# Patient Record
Sex: FEMALE | Race: WHITE | NOT HISPANIC OR LATINO | URBAN - METROPOLITAN AREA
[De-identification: names, ages, dates, MRNs, and addresses within clinical notes are randomized per-mention and may not be internally consistent; named-entity substitution may affect disease eponyms.]

---

## 2024-10-16 ENCOUNTER — TELEPHONE (OUTPATIENT)
Age: 44
End: 2024-10-16

## 2024-10-16 NOTE — TELEPHONE ENCOUNTER
CALLED AND SPOKE WITH PATIENT. SHE DOES NOT HAVE AN APPOINTMENT YET. STATED SHE HAS NOT HEARD ANYTHING.

## 2024-10-16 NOTE — TELEPHONE ENCOUNTER
PATHOLOGY AND CYTOLOGY LABS NOTIFIED OF CLIENT ABNORMAL PAP AND HPV SUMMARY.   COLLECTED: 9/25/24  GRADE ASCUS  HPV 16 AND HPV 18/45 ARE NEGATIVE   HPV POOL IS POSITIVE.

## 2024-10-22 LAB
NCCN CRITERIA FLAG: NORMAL
TYRER CUZICK SCORE: 19.6

## 2024-10-26 ENCOUNTER — PATIENT MESSAGE (OUTPATIENT)
Age: 44
End: 2024-10-26
Payer: COMMERCIAL

## 2024-10-29 ENCOUNTER — HOSPITAL ENCOUNTER (OUTPATIENT)
Facility: HOSPITAL | Age: 44
Discharge: HOME | End: 2024-10-29
Admitting: FAMILY MEDICINE
Payer: COMMERCIAL

## 2024-10-29 DIAGNOSIS — Z12.31 VISIT FOR SCREENING MAMMOGRAM: ICD-10-CM

## 2024-10-29 PROCEDURE — 77063 BREAST TOMOSYNTHESIS BI: CPT

## 2024-10-29 PROCEDURE — 77067 SCR MAMMO BI INCL CAD: CPT

## 2024-11-04 ENCOUNTER — TELEPHONE (OUTPATIENT)
Dept: GASTROENTEROLOGY | Facility: CLINIC | Age: 44
End: 2024-11-04
Payer: COMMERCIAL

## 2024-11-08 ENCOUNTER — OUTSIDE FACILITY SERVICE (OUTPATIENT)
Dept: GASTROENTEROLOGY | Facility: CLINIC | Age: 44
End: 2024-11-08
Payer: COMMERCIAL

## 2024-11-08 PROCEDURE — 45378 DIAGNOSTIC COLONOSCOPY: CPT | Performed by: INTERNAL MEDICINE

## 2024-12-12 ENCOUNTER — OFFICE VISIT (OUTPATIENT)
Age: 44
End: 2024-12-12
Payer: COMMERCIAL

## 2024-12-12 VITALS
BODY MASS INDEX: 25.29 KG/M2 | WEIGHT: 148.1 LBS | HEIGHT: 64 IN | DIASTOLIC BLOOD PRESSURE: 88 MMHG | HEART RATE: 70 BPM | OXYGEN SATURATION: 99 % | SYSTOLIC BLOOD PRESSURE: 132 MMHG

## 2024-12-12 DIAGNOSIS — F90.0 ATTENTION DEFICIT HYPERACTIVITY DISORDER (ADHD), PREDOMINANTLY INATTENTIVE TYPE: Primary | Chronic | ICD-10-CM

## 2024-12-12 DIAGNOSIS — F41.1 GENERALIZED ANXIETY DISORDER: Chronic | ICD-10-CM

## 2024-12-12 DIAGNOSIS — Z51.81 ENCOUNTER FOR THERAPEUTIC DRUG MONITORING: ICD-10-CM

## 2024-12-12 RX ORDER — METHYLPHENIDATE HYDROCHLORIDE 18 MG/1
18 TABLET ORAL DAILY
Qty: 30 TABLET | Refills: 0 | Status: SHIPPED | OUTPATIENT
Start: 2024-12-12 | End: 2025-12-12
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
## 2024-12-17 LAB
1OH-MIDAZOLAM UR QL SCN: NOT DETECTED NG/MG CREAT
6MAM UR QL SCN: NEGATIVE NG/ML
7AMINOCLONAZEPAM/CREAT UR: NOT DETECTED NG/MG CREAT
A-OH ALPRAZ/CREAT UR: NOT DETECTED NG/MG CREAT
A-OH-TRIAZOLAM/CREAT UR CFM: NOT DETECTED NG/MG CREAT
ACP UR QL CFM: NOT DETECTED
ALPRAZ/CREAT UR CFM: NOT DETECTED NG/MG CREAT
AMPHETAMINES UR QL SCN: NEGATIVE NG/ML
APAP UR QL SCN: NEGATIVE UG/ML
BARBITURATES UR QL SCN: NEGATIVE NG/ML
BENZODIAZ SCN METH UR: NEGATIVE
BUPRENORPHINE UR QL SCN: NEGATIVE
BUPRENORPHINE/CREAT UR: NOT DETECTED NG/MG CREAT
CANNABINOIDS UR QL SCN: NEGATIVE NG/ML
CARISOPRODOL UR QL: NEGATIVE NG/ML
CLONAZEPAM/CREAT UR CFM: NOT DETECTED NG/MG CREAT
COCAINE+BZE UR QL SCN: NEGATIVE NG/ML
CREAT UR-MCNC: 245 MG/DL
D-METHORPHAN UR-MCNC: NOT DETECTED NG/ML
D-METHORPHAN+LEVORPHANOL UR QL: NOT DETECTED
DESALKYLFLURAZ/CREAT UR: NOT DETECTED NG/MG CREAT
DIAZEPAM/CREAT UR: NOT DETECTED NG/MG CREAT
ETHANOL UR QL SCN: NEGATIVE G/DL
ETHANOL UR QL SCN: NEGATIVE NG/ML
FENTANYL CTO UR SCN-MCNC: NEGATIVE NG/ML
FENTANYL/CREAT UR: NOT DETECTED NG/MG CREAT
FLUNITRAZEPAM UR QL SCN: NOT DETECTED NG/MG CREAT
GABAPENTIN UR-MCNC: NEGATIVE UG/ML
HALLUCINOGENS UR: NEGATIVE
HYPNOTICS UR QL SCN: NEGATIVE
KETAMINE UR QL: NOT DETECTED
LORAZEPAM/CREAT UR: NOT DETECTED NG/MG CREAT
MEPERIDINE UR QL SCN: NEGATIVE NG/ML
METHADONE UR QL SCN: NEGATIVE NG/ML
METHADONE+METAB UR QL SCN: NEGATIVE NG/ML
MIDAZOLAM/CREAT UR CFM: NOT DETECTED NG/MG CREAT
MISCELLANEOUS, UR: NEGATIVE
NORBUPRENORPHINE/CREAT UR: NOT DETECTED NG/MG CREAT
NORDIAZEPAM/CREAT UR: NOT DETECTED NG/MG CREAT
NORFENTANYL/CREAT UR: NOT DETECTED NG/MG CREAT
NORFLUNITRAZEPAM UR-MCNC: NOT DETECTED NG/MG CREAT
NORKETAMINE UR-MCNC: NOT DETECTED UG/ML
OPIATES UR SCN-MCNC: NEGATIVE NG/ML
OXAZEPAM/CREAT UR: NOT DETECTED NG/MG CREAT
OXYCODONE CTO UR SCN-MCNC: NEGATIVE NG/ML
PCP UR QL SCN: NEGATIVE NG/ML
PRESCRIBED MEDICATIONS: NORMAL
PROPOXYPH UR QL SCN: NEGATIVE NG/ML
TAPENTADOL CTO UR SCN-MCNC: NEGATIVE NG/ML
TEMAZEPAM/CREAT UR: NOT DETECTED NG/MG CREAT
TRAMADOL UR QL SCN: NEGATIVE NG/ML
ZALEPLON UR-MCNC: NOT DETECTED NG/ML
ZOLPIDEM PHENYL-4-CARB UR QL SCN: NOT DETECTED
ZOLPIDEM UR QL SCN: NOT DETECTED
ZOPICLONE-N-OXIDE UR-MCNC: NOT DETECTED NG/ML

## 2025-01-15 ENCOUNTER — OFFICE VISIT (OUTPATIENT)
Age: 45
End: 2025-01-15
Payer: COMMERCIAL

## 2025-01-15 VITALS
SYSTOLIC BLOOD PRESSURE: 118 MMHG | DIASTOLIC BLOOD PRESSURE: 78 MMHG | OXYGEN SATURATION: 99 % | WEIGHT: 155.7 LBS | HEIGHT: 64 IN | BODY MASS INDEX: 26.58 KG/M2 | HEART RATE: 70 BPM

## 2025-01-15 DIAGNOSIS — F41.1 GENERALIZED ANXIETY DISORDER: Chronic | ICD-10-CM

## 2025-01-15 DIAGNOSIS — F90.0 ATTENTION DEFICIT HYPERACTIVITY DISORDER (ADHD), PREDOMINANTLY INATTENTIVE TYPE: Primary | Chronic | ICD-10-CM

## 2025-01-15 RX ORDER — METHYLPHENIDATE HYDROCHLORIDE 18 MG/1
18 TABLET ORAL DAILY
Qty: 30 TABLET | Refills: 0 | Status: SHIPPED | OUTPATIENT
Start: 2025-01-15 | End: 2026-01-15

## 2025-01-15 RX ORDER — BUSPIRONE HYDROCHLORIDE 15 MG/1
15 TABLET ORAL DAILY
Qty: 90 TABLET | Refills: 1 | Status: SHIPPED | OUTPATIENT
Start: 2025-01-15

## 2025-01-15 NOTE — PROGRESS NOTES
[facility]             Follow Up Office Visit      Patient Name: Yancy Garcia  : 1980   MRN: [id number]     Referring Provider: Vonda Street MD    Chief Complaint:      ICD-10-CM ICD-9-CM   1. Attention deficit hyperactivity disorder (ADHD), predominantly inattentive type  F90.0 314.00   2. Generalized anxiety disorder  F41.1 300.02      History of Present Illness:   Yancy Garcia is a 44 y.o. female   History of Present Illness  The patient presents for evaluation of ADHD. She reports a positive response to Concerta, with an improvement in her symptoms. She does not experience excessive anxiety or irritability while on the medication. She feels more stable and prefers to continue with the current dosage, which she takes 3 to 4 times per week. She also reports a significant reduction in headaches in comparison to when she took Adderall. She does not believe that Concerta affects her sleep as she administers it early in the day. However, she has been experiencing sleep disturbances, which she considers normal for her, but prefers not to take any additionally sedating medications (aside from melatonin) due to concerns of her daughter needing her overnight. Overall, she is happy with the medication switch. She feels as though mood has been stable. She denies any SI, intent or plan. We will keep dose of medication the same today and follow up in 2 mo.      Previous Medication Trials:  Adderall    Subjective      Review of Systems:   Review of Systems   Constitutional:  Positive for fatigue. Negative for chills, diaphoresis and fever.   HENT:  Positive for congestion. Negative for sore throat and swollen glands.    Respiratory:  Negative for cough.    Cardiovascular:  Negative for chest pain.   Gastrointestinal:  Negative for abdominal pain, nausea and vomiting.   Genitourinary:  Negative for dysuria.   Musculoskeletal:  Negative for myalgias and neck pain.   Skin:   "Negative for rash.   Neurological:  Negative for weakness and numbness.     Screening Scores:   PHQ-9 : 9  TAI-7 : 10    Medications:     Current Outpatient Medications:     busPIRone (BUSPAR) 15 MG tablet, Take 1 tablet by mouth Daily., Disp: 90 tablet, Rfl: 1    methylphenidate (Concerta) 18 MG CR tablet, Take 1 tablet by mouth Daily, Disp: 30 tablet, Rfl: 0    tretinoin (RETIN-A) 0.1 % cream, Apply 1 Application topically to the appropriate area as directed Every Night., Disp: , Rfl:     Medication Considerations:  DHEERAJ reviewed and appropriate.     Allergies:   No Known Allergies    Objective     Vital Signs:   Vitals:    01/15/25 0856   BP: 118/78   Pulse: 70   SpO2: 99%   Weight: 70.6 kg (155 lb 11.2 oz)   Height: 163 cm (64.17\")     Body mass index is 26.58 kg/m².     Mental Status Exam:   MENTAL STATUS EXAM   General Appearance:  Cleanly groomed and dressed  Eye Contact:  Good eye contact  Attitude:  Cooperative  Motor Activity:  Normal gait, posture  Muscle Strength:  Normal  Speech:  Normal rate, tone, volume  Language:  Spontaneous  Mood and affect:  Normal, pleasant and appropriate  Hopelessness:  Denies  Thought Process:  Logical and goal-directed  Associations/ Thought Content:  No delusions  Hallucinations:  None  Suicidal Ideations:  Not present  Homicidal Ideation:  Not present  Sensorium:  Alert  Orientation:  Person, place, time and situation  Immediate Recall, Recent, and Remote Memory:  Intact  Attention Span/ Concentration:  Good  Fund of Knowledge:  Appropriate for age and educational level  Intellectual Functioning:  Average range  Insight:  Fair  Judgement:  Fair  Reliability:  Fair  Impulse Control:  Fair      SUICIDE RISK ASSESSMENT/CSSRS:  1. Does patient have thoughts of suicide? denies  2. Does patient have intent for suicide? denies  3. Does patient have a current plan for suicide? denies  4. History of suicide attempts: denies  5. Family history of suicide or attempts: denies  6. " History of violent behaviors towards others or property or thoughts of committing suicide: denies  7. History of sexual aggression toward others: denies  8. Access to firearms or weapons: denies    Assessment / Plan      Visit Diagnosis/Orders Placed This Visit:  Diagnoses and all orders for this visit:  Assessment & Plan  1. Attention deficit hyperactivity disorder (ADHD).  She reports improvement in her symptoms with the current dose of Concerta, taken 3 to 4 times a week. She does not experience significant side effects and feels more level. She will continue with the current dosage of Concerta.     Follow-up  The patient will follow up in 03/2025.    1. Attention deficit hyperactivity disorder (ADHD), predominantly inattentive type (Primary)  2. Generalized anxiety disorder  - Continue Concerta 18 mg po daily  - Continue Buspar Prn anxiety  - CPT completed and reviewed today  - CSA signed  - Follow up in 1 mo  Labs Reviewed : Labs from 9/25/24 reviewed  Chart Reviewed      Functional Status: Mild impairment      Prognosis: Fair with Ongoing Treatment     Impression/Formulation:  Patient appeared alert and oriented. Patient is receptive to assistance with maintaining a stable lifestyle.  Patient presents with history of     ICD-10-CM ICD-9-CM   1. Attention deficit hyperactivity disorder (ADHD), predominantly inattentive type  F90.0 314.00   2. Generalized anxiety disorder  F41.1 300.02     Treatment Plan:     Patient will continue supportive psychotherapy efforts and medications as indicated. Clinic will obtain release of information for current treatment team for continuity of care as needed. Patient will contact this office, call 911 or present to the nearest emergency room should suicidal or homicidal ideations occur.  Discussed medication options and treatment plan of prescribed medication(s) as well as the risks, benefits, and potential side effects. Patient ackowledged and verbally consented to continue  with current treatment plan and was educated on the importance of compliance with treatment and follow-up appointments.     Quality Measures:  Tobacco: Yancy Garcia  reports that she has never smoked. She does not have any smokeless tobacco history on file. I have educated her on the risk of diseases from using tobacco products such as cancer, COPD, and heart disease.     Depression (PHQ >11): Patient screened positive for depression based on a PHQ-9 score of 9 on 1/15/2025. Follow-up recommendations include:  follow up with writer in 1 mo, continue medications as prescribed .     Follow Up:   Return in about 2 months (around 3/15/2025) for Medication Management.    Short-term goals: Patient will adhere to medication regimen and experience continued improvement in symptoms over the next 3 months.   Long-term goals: Patient will adhere to medication treatment plan and report improvement in symptoms over the next 6 months    Shital Sagastume MD  [facility] [address]     This is electronically signed by Shital Sagastume MD     Patient or patient representative verbalized consent for the use of Ambient Listening during the visit with  Shital Sagastume MD for chart documentation. 1/15/2025  10:05 EST

## 2025-02-12 DIAGNOSIS — F90.0 ATTENTION DEFICIT HYPERACTIVITY DISORDER (ADHD), PREDOMINANTLY INATTENTIVE TYPE: Chronic | ICD-10-CM

## 2025-02-12 RX ORDER — METHYLPHENIDATE HYDROCHLORIDE 18 MG/1
18 TABLET ORAL DAILY
Qty: 30 TABLET | Refills: 0 | Status: SHIPPED | OUTPATIENT
Start: 2025-02-12 | End: 2026-02-12

## 2025-02-12 NOTE — TELEPHONE ENCOUNTER
Rx Refill Note  Requested Prescriptions     Pending Prescriptions Disp Refills    methylphenidate (Concerta) 18 MG CR tablet 30 tablet 0     Sig: Take 1 tablet by mouth Daily      Last office visit with prescribing clinician: 1/15/2025   Last telemedicine visit with prescribing clinician: Visit date not found   Next office visit with prescribing clinician: 3/4/2025     Harriet Jolly MA  02/12/25, 12:27 EST

## 2025-06-19 DIAGNOSIS — F90.0 ATTENTION DEFICIT HYPERACTIVITY DISORDER (ADHD), PREDOMINANTLY INATTENTIVE TYPE: Chronic | ICD-10-CM

## 2025-06-19 RX ORDER — METHYLPHENIDATE HYDROCHLORIDE 18 MG/1
18 TABLET ORAL DAILY
Qty: 30 TABLET | Refills: 0 | Status: SHIPPED | OUTPATIENT
Start: 2025-06-19 | End: 2025-07-19

## 2025-06-19 NOTE — TELEPHONE ENCOUNTER
Patient is requesting a refill on the following medication: methylphenidate (Concerta) 18 MG CR tablet     Pharmacy: [id number] - [address]     Vitals at last visit:   Blood Pressure - 118/78  HR - 70  Oxygen - 99%   [facility] Location - [place]  Date - 1/15/2025    Current CSA on file: Yes    UDS within the last year: Yes    UDS abnormal: Yes

## 2025-07-28 NOTE — PROGRESS NOTES
[facility]             Follow Up Office Visit      Patient Name: Yancy Garcia  : 1980   MRN: [id number]     Referring Provider: Vonda Street MD    Chief Complaint:      ICD-10-CM ICD-9-CM   1. Attention deficit hyperactivity disorder (ADHD), predominantly inattentive type  F90.0 314.00   2. Generalized anxiety disorder  F41.1 300.02     History of Present Illness:   Yancy Garcia is a 44 y.o. female   History of Present Illness    The patient presents for evaluation of ADHD. She reports experiencing fatigue for over a month, describing it as constant exhaustion. Despite not having trouble falling asleep, she struggles to maintain sleep throughout the night, often due to restlessness. Her mood and anxiety levels are generally stable, but she has noticed weight gain and a lack of energy to engage in activities. She is uncertain if these symptoms are hormone-related or age related. She has started using melatonin to improve her sleep quality.    Pertinent Negatives:   - No unusual difficulty falling asleep  - No headaches related to current medication    She has been taking Concerta daily from Monday to Friday, excluding most weekends, but feels it may not be as effective as initially. She recalls that the medication was beneficial at first. She continues to experience headaches, but does not believe they are related to the medication. She previously took Adderall, which caused severe headaches, but the current medication does not seem to have this side effect. She suspects her current headaches may be sinus-related.     We will increase dose of Concerta today to further address restlessness and issues with focus. She is agreeable with this. After 1 mo, if she needs further increase she will let writer know. We will follow up in 3 mo.      Previous Medication Trials:  Adderall    Subjective      Review of Systems:   Review of Systems   Constitutional:  Positive for  "fatigue. Negative for chills, diaphoresis and fever.   HENT:  Positive for congestion. Negative for sore throat and swollen glands.    Respiratory:  Negative for cough.    Cardiovascular:  Negative for chest pain.   Gastrointestinal:  Negative for abdominal pain, nausea and vomiting.   Genitourinary:  Negative for dysuria.   Musculoskeletal:  Negative for myalgias and neck pain.   Skin:  Negative for rash.   Neurological:  Negative for weakness and numbness.     Screening Scores:   PHQ-9 : 14  TAI-7 : 7    Medications:     Current Outpatient Medications:     busPIRone (BUSPAR) 15 MG tablet, Take 1 tablet by mouth Daily., Disp: 90 tablet, Rfl: 1    tretinoin (RETIN-A) 0.1 % cream, Apply 1 Application topically to the appropriate area as directed Every Night., Disp: , Rfl:     methylphenidate (Concerta) 27 MG CR tablet, Take 1 tablet by mouth Daily, Disp: 30 tablet, Rfl: 0    Medication Considerations:  DHEERAJ reviewed and appropriate.     Allergies:   No Known Allergies    Objective     Vital Signs:   Vitals:    07/29/25 0849   BP: 120/76   Pulse: 68   SpO2: 98%   Weight: 71.7 kg (158 lb)   Height: 162.6 cm (64\")       Body mass index is 27.12 kg/m².     Mental Status Exam:   MENTAL STATUS EXAM   General Appearance:  Cleanly groomed and dressed  Eye Contact:  Good eye contact  Attitude:  Cooperative  Motor Activity:  Normal gait, posture  Muscle Strength:  Normal  Speech:  Normal rate, tone, volume  Language:  Spontaneous  Mood and affect:  Normal, pleasant and appropriate  Hopelessness:  Denies  Thought Process:  Logical and goal-directed  Associations/ Thought Content:  No delusions  Hallucinations:  None  Suicidal Ideations:  Not present  Homicidal Ideation:  Not present  Sensorium:  Alert  Orientation:  Person, place, time and situation  Immediate Recall, Recent, and Remote Memory:  Intact  Attention Span/ Concentration:  Good  Fund of Knowledge:  Appropriate for age and educational level  Intellectual " Functioning:  Average range  Insight:  Fair  Judgement:  Fair  Reliability:  Fair  Impulse Control:  Fair      SUICIDE RISK ASSESSMENT/CSSRS:  1. Does patient have thoughts of suicide? denies  2. Does patient have intent for suicide? denies  3. Does patient have a current plan for suicide? denies  4. History of suicide attempts: denies  5. Family history of suicide or attempts: denies  6. History of violent behaviors towards others or property or thoughts of committing suicide: denies  7. History of sexual aggression toward others: denies  8. Access to firearms or weapons: denies    Assessment / Plan      Visit Diagnosis/Orders Placed This Visit:  Diagnoses and all orders for this visit:  Assessment & Plan  Problems:  - Fatigue  - Focus issues    Content of Therapy:  During the session, the patient discussed her persistent fatigue and restlessness at night, despite taking melatonin. She also mentioned her struggles with focus and the ineffectiveness of her current Concerta dosage. The patient expressed concerns about gaining weight and feeling restless yet lacking energy. The conversation included exploring potential causes such as hormonal changes and the impact of medication on her symptoms.    Clinical Impression:  The patient appears to be experiencing significant fatigue and restlessness, which has persisted for over a month. She does not have difficulty falling asleep but struggles to stay asleep, leading to exhaustion. Her mood and anxiety levels seem stable, but she reports a lack of energy and focus. The current Concerta dosage may be insufficient, contributing to her difficulties with concentration and energy levels. Increasing the dosage may help alleviate these symptoms.    Therapeutic Intervention:  The therapeutic intervention included discussing the potential benefits of increasing the Concerta dosage from 18 mg to 27 mg to improve focus and energy levels. The patient was advised to monitor her  response to the new dosage and report any further needs for adjustment.    Plan:  - Increase Concerta dosage to 27 mg with a 30-day supply.  - Monitor response to the new dosage and request further adjustment if needed during the next refill call.  - Continue taking melatonin to aid sleep.    Follow-up:  A follow-up visit is scheduled for 3 months to assess the patient's response to the increased Concerta dosage and overall symptom management.    1. Attention deficit hyperactivity disorder (ADHD), predominantly inattentive type (Primary)  2. Generalized anxiety disorder  - Increase Concerta to 27 mg po daily  - Continue Buspar Prn anxiety  - CPT completed and reviewed  - CSA signed  - Follow up in 3 mo  Labs Reviewed : Labs from 9/25/24 reviewed  UDS Reviewed: UDS from 12/12/24 reviewed  Chart Reviewed      Functional Status: Mild impairment      Prognosis: Fair with Ongoing Treatment     Impression/Formulation:  Patient appeared alert and oriented. Patient is receptive to assistance with maintaining a stable lifestyle.  Patient presents with history of     ICD-10-CM ICD-9-CM   1. Attention deficit hyperactivity disorder (ADHD), predominantly inattentive type  F90.0 314.00   2. Generalized anxiety disorder  F41.1 300.02     Treatment Plan:     Patient will continue supportive psychotherapy efforts and medications as indicated. Clinic will obtain release of information for current treatment team for continuity of care as needed. Patient will contact this office, call 911 or present to the nearest emergency room should suicidal or homicidal ideations occur.  Discussed medication options and treatment plan of prescribed medication(s) as well as the risks, benefits, and potential side effects. Patient ackowledged and verbally consented to continue with current treatment plan and was educated on the importance of compliance with treatment and follow-up appointments.     Quality Measures:  Tobacco: Yancy Garcia  reports  that she has never smoked. She does not have any smokeless tobacco history on file. I have educated her on the risk of diseases from using tobacco products such as cancer, COPD, and heart disease.     Depression (PHQ >11): Patient screened positive for depression based on a PHQ-9 score of 14 on 7/29/2025. Follow-up recommendations include: follow up with writer in 3 mo, continue medications as prescribed.     Follow Up:   Return in about 3 months (around 10/29/2025) for Medication Management.    Short-term goals: Patient will adhere to medication regimen and experience continued improvement in symptoms over the next 3 months.   Long-term goals: Patient will adhere to medication treatment plan and report improvement in symptoms over the next 6 months    Shital Sagastume MD  [facility]     This is electronically signed by Shital Sagastume MD     Patient or patient representative verbalized consent for the use of Ambient Listening during the visit with  Shital Sagastume MD for chart documentation. 7/29/2025  10:05 EST

## 2025-07-29 ENCOUNTER — OFFICE VISIT (OUTPATIENT)
Age: 45
End: 2025-07-29
Payer: COMMERCIAL

## 2025-07-29 VITALS
HEIGHT: 64 IN | BODY MASS INDEX: 26.98 KG/M2 | HEART RATE: 68 BPM | SYSTOLIC BLOOD PRESSURE: 120 MMHG | WEIGHT: 158 LBS | OXYGEN SATURATION: 98 % | DIASTOLIC BLOOD PRESSURE: 76 MMHG

## 2025-07-29 DIAGNOSIS — F41.1 GENERALIZED ANXIETY DISORDER: ICD-10-CM

## 2025-07-29 DIAGNOSIS — F90.0 ATTENTION DEFICIT HYPERACTIVITY DISORDER (ADHD), PREDOMINANTLY INATTENTIVE TYPE: Primary | ICD-10-CM

## 2025-07-29 PROCEDURE — 99214 OFFICE O/P EST MOD 30 MIN: CPT | Performed by: STUDENT IN AN ORGANIZED HEALTH CARE EDUCATION/TRAINING PROGRAM

## 2025-07-29 PROCEDURE — 96127 BRIEF EMOTIONAL/BEHAV ASSMT: CPT | Performed by: STUDENT IN AN ORGANIZED HEALTH CARE EDUCATION/TRAINING PROGRAM

## 2025-07-29 RX ORDER — BUSPIRONE HYDROCHLORIDE 15 MG/1
15 TABLET ORAL DAILY
Qty: 90 TABLET | Refills: 1 | Status: SHIPPED | OUTPATIENT
Start: 2025-07-29

## 2025-07-29 RX ORDER — METHYLPHENIDATE HYDROCHLORIDE 27 MG/1
27 TABLET ORAL DAILY
Qty: 30 TABLET | Refills: 0 | Status: SHIPPED | OUTPATIENT
Start: 2025-07-29 | End: 2026-07-29